# Patient Record
(demographics unavailable — no encounter records)

---

## 2025-07-11 NOTE — HISTORY OF PRESENT ILLNESS
[de-identified] : Patient Name: JAMES LUIS MRN: [id number] Referring Provider: none Date: 07/16/2025   74 year old female presents for evaluation of gallstones  6/2025 - Patient having unspecified abdominal pain  6/13/2025 - Ultrasound of abdomen showing gallstones without gallbladder wall thickening. There is a murphys sign   Currently, Ms. LUIS ~  ~  abdominal pain and discomfort, ~  ~ having decreased appetite, and ~  ~ nausea or vomiting. She ~  ~ changes in bowel habits and ~  ~ recent unintentional weight loss. She ~  ~ fevers, chills, or night sweats.   Functional Status: Ms. LUIS is able to ~  ~ without fatigue or dyspnea.   She ~  ~ genetic testing